# Patient Record
Sex: FEMALE | Race: WHITE | NOT HISPANIC OR LATINO | Employment: FULL TIME | URBAN - METROPOLITAN AREA
[De-identification: names, ages, dates, MRNs, and addresses within clinical notes are randomized per-mention and may not be internally consistent; named-entity substitution may affect disease eponyms.]

---

## 2019-09-30 ENCOUNTER — TRANSCRIBE ORDERS (OUTPATIENT)
Dept: ADMINISTRATIVE | Facility: HOSPITAL | Age: 39
End: 2019-09-30

## 2019-09-30 DIAGNOSIS — Z12.31 VISIT FOR SCREENING MAMMOGRAM: Primary | ICD-10-CM

## 2019-12-16 ENCOUNTER — HOSPITAL ENCOUNTER (OUTPATIENT)
Dept: MAMMOGRAPHY | Facility: HOSPITAL | Age: 39
Discharge: HOME | End: 2019-12-16
Admitting: OBSTETRICS & GYNECOLOGY

## 2019-12-16 DIAGNOSIS — Z12.31 VISIT FOR SCREENING MAMMOGRAM: ICD-10-CM

## 2019-12-16 PROCEDURE — 77067 SCR MAMMO BI INCL CAD: CPT

## 2019-12-16 PROCEDURE — 77067 SCR MAMMO BI INCL CAD: CPT | Performed by: RADIOLOGY

## 2019-12-16 PROCEDURE — 77063 BREAST TOMOSYNTHESIS BI: CPT | Performed by: RADIOLOGY

## 2019-12-16 PROCEDURE — 77063 BREAST TOMOSYNTHESIS BI: CPT

## 2020-11-05 ENCOUNTER — TRANSCRIBE ORDERS (OUTPATIENT)
Dept: OBSTETRICS AND GYNECOLOGY | Facility: CLINIC | Age: 40
End: 2020-11-05

## 2020-11-05 DIAGNOSIS — Z12.31 VISIT FOR SCREENING MAMMOGRAM: Primary | ICD-10-CM

## 2021-03-01 ENCOUNTER — OFFICE VISIT (OUTPATIENT)
Dept: OBSTETRICS AND GYNECOLOGY | Facility: CLINIC | Age: 41
End: 2021-03-01

## 2021-03-01 VITALS
DIASTOLIC BLOOD PRESSURE: 68 MMHG | HEIGHT: 68 IN | BODY MASS INDEX: 23.86 KG/M2 | SYSTOLIC BLOOD PRESSURE: 102 MMHG | WEIGHT: 157.4 LBS

## 2021-03-01 DIAGNOSIS — Z01.419 ENCOUNTER FOR GYNECOLOGICAL EXAMINATION: Primary | ICD-10-CM

## 2021-03-01 PROCEDURE — 99396 PREV VISIT EST AGE 40-64: CPT | Performed by: OBSTETRICS & GYNECOLOGY

## 2021-03-01 NOTE — PROGRESS NOTES
GYN Annual Exam     CC - Here for annual exam.     Calderon Hodges is a 40 y.o. female, , who presents for annual well woman exam.  Her last LMP was Patient's last menstrual period was 2021 (exact date)..  Periods are regular every 28-30 days, lasting 6 days.  Dysmenorrhea:none.  Patient reports problems with: none.  Partner Status: Marital Status: .  New Partners since last visit: YES/NO/Other: no.  Desires STD Screening: YES/NO/Other: no.    Additional OB/GYN History   Current contraception: vasectomy  Last Pap :   Last Completed Pap Smear       Status Date      PAP SMEAR Done 2019 in [place] (negative)        History of abnormal Pap smear: no  Family history of uterine, colon, breast, or ovarian cancer: yes  Maternal aunt had colon cancer  Performs monthly Self-Breast Exam: yes  Last mammogram:   Last Completed Mammogram       Status Date      MAMMOGRAM Done 2019 MAMMO SCREENING DIGITAL TOMOSYNTHESIS BILATERAL W CAD        Feelings of Anxiety or Depression: no  Tobacco Usage?: No   OB History        1    Para   1    Term   1            AB        Living   1       SAB        TAB        Ectopic        Molar        Multiple        Live Births   1                Health Maintenance   Topic Date Due   • Annual Gynecologic Pelvic and Breast Exam  1979   • ANNUAL PHYSICAL  10/06/1982   • TDAP/TD VACCINES (1 - Tdap) 10/06/1998   • HEPATITIS C SCREENING  10/09/2020   • MAMMOGRAM  2021   • PAP SMEAR  2022   • INFLUENZA VACCINE  Completed   • Pneumococcal Vaccine 0-64  Aged Out   • MENINGOCOCCAL VACCINE  Aged Out       No current outpatient medications on file.    The additional following portions of the patient's history were reviewed and updated as appropriate: allergies, current medications, past family history, past medical history, past social history, past surgical history and problem list.    Review of Systems   All other systems reviewed and  "are negative.      I have reviewed and agree with the HPI, ROS, and historical information as entered above. Can Villalobos MD    Objective   /68 (BP Location: Right arm, Patient Position: Sitting, Cuff Size: Adult)   Ht 172.7 cm (68\")   Wt 71.4 kg (157 lb 6.4 oz)   LMP 02/19/2021 (Exact Date)   Breastfeeding No   BMI 23.93 kg/m²     PE    Breast: Without masses ,nontender, no skin changes or retractions  Axilla: Normal, no lymphadenopathy  Heart: Regular rate no murmurs rubs or gallops  Lungs: Clear to auscultation, normal breath sounds bilaterally  Abdomen: Soft nontender, no hepatosplenomegaly, no guarding or rebound, no masses  Pelvic exam  External genitalia: Normal introitus and vulva  Vagina: Normal mucosa no bleeding inflammation or discharge  Bladder: Normal position nontender  Urethral meatus and urethra: Normal nontender  Cervix: No lesions, no discharge, bleeding or inflammation  Bimanual: Nontender adnexa clear, no sign of uterine or ovarian enlargement  Anal: No external lesions or hemorrhoids         Assessment/Plan     Assessment     Problem List Items Addressed This Visit        Genitourinary and Reproductive     Encounter for gynecological examination - Primary    Relevant Orders    Pap IG, HPV-hr          1. GYN annual well woman exam.   2. Normal exam, she can start Paps every 2 to 3 years    Plan     1. Next pap due in: 2 to 3 years  2. Reviewed HPV guidelines  3. Reviewed monthly self breast exams.  Instructed to call with lumps, pain, or breast discharge.  Yearly mammograms ordered.  4. Discussed signs and symptoms related to perimenopause.  Discussed OTC treatments for these symptoms.    5. Colonoscopy recommended.  Healthy lifestyle changes including diet and exercise reviewed  Preventative health initiatives reviewed  Patient with a family history of colon cancer may want to consider doing a colonoscopy earlier than 50, Cologuard might be an option also  Can Villalobos, " MD  03/01/2021

## 2021-03-04 DIAGNOSIS — Z01.419 ENCOUNTER FOR GYNECOLOGICAL EXAMINATION: ICD-10-CM

## 2021-03-08 ENCOUNTER — HOSPITAL ENCOUNTER (OUTPATIENT)
Dept: MAMMOGRAPHY | Facility: HOSPITAL | Age: 41
Discharge: HOME | End: 2021-03-08
Admitting: OBSTETRICS & GYNECOLOGY

## 2021-03-08 DIAGNOSIS — Z12.31 VISIT FOR SCREENING MAMMOGRAM: ICD-10-CM

## 2021-03-08 PROCEDURE — 77063 BREAST TOMOSYNTHESIS BI: CPT

## 2021-03-08 PROCEDURE — 77067 SCR MAMMO BI INCL CAD: CPT

## 2021-03-08 PROCEDURE — 77063 BREAST TOMOSYNTHESIS BI: CPT | Performed by: RADIOLOGY

## 2021-03-08 PROCEDURE — 77067 SCR MAMMO BI INCL CAD: CPT | Performed by: RADIOLOGY

## 2021-03-16 ENCOUNTER — HOSPITAL ENCOUNTER (OUTPATIENT)
Dept: ULTRASOUND IMAGING | Facility: HOSPITAL | Age: 41
Discharge: HOME | End: 2021-03-16
Admitting: RADIOLOGY

## 2021-03-16 DIAGNOSIS — R92.8 ABNORMAL MAMMOGRAM: ICD-10-CM

## 2021-03-16 PROCEDURE — 76642 ULTRASOUND BREAST LIMITED: CPT | Performed by: RADIOLOGY

## 2021-03-16 PROCEDURE — 76642 ULTRASOUND BREAST LIMITED: CPT

## 2022-01-04 ENCOUNTER — TRANSCRIBE ORDERS (OUTPATIENT)
Dept: ADMINISTRATIVE | Facility: HOSPITAL | Age: 42
End: 2022-01-04

## 2022-01-04 DIAGNOSIS — Z12.31 VISIT FOR SCREENING MAMMOGRAM: Primary | ICD-10-CM

## 2022-03-14 ENCOUNTER — HOSPITAL ENCOUNTER (OUTPATIENT)
Dept: MAMMOGRAPHY | Facility: HOSPITAL | Age: 42
Discharge: HOME | End: 2022-03-14
Admitting: OBSTETRICS & GYNECOLOGY

## 2022-03-14 DIAGNOSIS — Z12.31 VISIT FOR SCREENING MAMMOGRAM: ICD-10-CM

## 2022-03-14 PROCEDURE — 77067 SCR MAMMO BI INCL CAD: CPT | Performed by: RADIOLOGY

## 2022-03-14 PROCEDURE — 77063 BREAST TOMOSYNTHESIS BI: CPT | Performed by: RADIOLOGY

## 2022-03-14 PROCEDURE — 77067 SCR MAMMO BI INCL CAD: CPT

## 2022-03-14 PROCEDURE — 77063 BREAST TOMOSYNTHESIS BI: CPT

## 2022-09-19 ENCOUNTER — OFFICE VISIT (OUTPATIENT)
Dept: OBSTETRICS AND GYNECOLOGY | Facility: CLINIC | Age: 42
End: 2022-09-19

## 2022-09-19 VITALS
SYSTOLIC BLOOD PRESSURE: 110 MMHG | DIASTOLIC BLOOD PRESSURE: 70 MMHG | HEIGHT: 68 IN | BODY MASS INDEX: 22.58 KG/M2 | WEIGHT: 149 LBS

## 2022-09-19 DIAGNOSIS — Z01.419 ENCOUNTER FOR GYNECOLOGICAL EXAMINATION WITHOUT ABNORMAL FINDING: Primary | ICD-10-CM

## 2022-09-19 PROCEDURE — 99396 PREV VISIT EST AGE 40-64: CPT | Performed by: OBSTETRICS & GYNECOLOGY

## 2022-09-19 RX ORDER — ERGOCALCIFEROL 1.25 MG/1
CAPSULE ORAL
COMMUNITY
Start: 2022-09-15

## 2022-09-19 RX ORDER — DROSPIRENONE AND ESTETROL 3-14.2(28)
1 KIT ORAL DAILY
Qty: 90 TABLET | Refills: 3 | Status: SHIPPED | OUTPATIENT
Start: 2022-09-19

## 2023-01-13 ENCOUNTER — TRANSCRIBE ORDERS (OUTPATIENT)
Dept: ADMINISTRATIVE | Facility: HOSPITAL | Age: 43
End: 2023-01-13
Payer: COMMERCIAL

## 2023-01-13 DIAGNOSIS — Z12.31 VISIT FOR SCREENING MAMMOGRAM: Primary | ICD-10-CM

## 2023-01-27 ENCOUNTER — TRANSCRIBE ORDERS (OUTPATIENT)
Dept: ADMINISTRATIVE | Facility: HOSPITAL | Age: 43
End: 2023-01-27
Payer: COMMERCIAL

## 2023-01-27 DIAGNOSIS — I83.812 VARICOSE VEINS OF LEFT LOWER EXTREMITY WITH PAIN: Primary | ICD-10-CM

## 2023-02-20 ENCOUNTER — HOSPITAL ENCOUNTER (OUTPATIENT)
Dept: CARDIOLOGY | Facility: HOSPITAL | Age: 43
Discharge: HOME | End: 2023-02-20
Admitting: SURGERY
Payer: COMMERCIAL

## 2023-02-20 VITALS — WEIGHT: 150 LBS | HEIGHT: 68 IN | BODY MASS INDEX: 22.73 KG/M2

## 2023-02-20 DIAGNOSIS — I83.812 VARICOSE VEINS OF LEFT LOWER EXTREMITY WITH PAIN: ICD-10-CM

## 2023-02-20 PROCEDURE — 93971 EXTREMITY STUDY: CPT

## 2023-02-20 PROCEDURE — 93971 EXTREMITY STUDY: CPT | Performed by: INTERNAL MEDICINE

## 2023-02-21 LAB
BH CV LEFT LOWER VAS GSV BELOW KNEE REFLUX TIME: 0 SEC
BH CV LEFT LOWER VAS GSV KNEE REFLUX TIME: 4.3 SEC
BH CV LEFT LOWER VAS GSV KNEE TRANSVERSE DIAMETER: 0.3 CM
BH CV LEFT LOWER VAS GSV MID CALF REFLUX TIME: 0 SEC
BH CV LEFT LOWER VAS GSV MID CALF TRANS DIAMETER: 0.3 CM
BH CV LEFT LOWER VAS GSV PROX REFLUX TIME: 4.6 SEC
BH CV LEFT LOWER VAS GSV PROX TRANSVERSE DIAMETER: 0.4 CM
BH CV LEFT LOWER VAS GSVBELOW KNEE TRANSVERSE DIAMETER: 0.3 CM
BH CV LEFT LOWER VAS SAPHENOFEMORAL JUNCTION REFLUX TIME: 3.7 SEC
BH CV LEFT LOWER VAS SAPHENOFEMORAL JUNCTION TRANSVERSE DIAMETER: 0.5 CM
BH CV LEFT LOWER VAS SPJ REFLUX TIME: 0 SEC
BH CV LEFT LOWER VAS SPJ TRANS DIAMETER: 0.2 CM
BH CV LEFT LOWER VAS SSV MID CALF REFLUX TIME: 0 SEC
BH CV LEFT LOWER VAS SSV MID CALF TRANS DIAMETER: 0.2 CM
BH CV LEFT LOWER VAS VARICOSITY AK REFLUX TIME: 4.2 SEC
BH CV LEFT LOWER VAS VARICOSITY AK TRANS DIAMETER: 0.64 CM
BH CV LEFT LOWER VAS VARICOSITY BK TRANS DIAMETER: 0.22 CM
BH CV LOW VAS LEFT GREATER SAPH AK VESSEL: 1
BH CV LOW VAS LEFT GSV DIST THIGH VESSEL: 1
BH CV LOW VAS LEFT SAPHENOFEM VESSEL: 1
BH CV LOW VAS LEFT VARICOSITY AK VESSEL: 1
BH CV LOW VAS LEFT VARICOSITY BK VESSEL: 1
BH CV LOWER VAS LEFT GSV DIST THIGH COMPRESSIBILTY: NORMAL
BH CV LOWER VAS LEFT GSV MID CALF COMPRESSIBILTY: NORMAL
BH CV LOWER VASCULAR LEFT COMMON FEMORAL AUGMENT: NORMAL
BH CV LOWER VASCULAR LEFT COMMON FEMORAL COMPETENT: NORMAL
BH CV LOWER VASCULAR LEFT COMMON FEMORAL COMPRESS: NORMAL
BH CV LOWER VASCULAR LEFT COMMON FEMORAL PHASIC: NORMAL
BH CV LOWER VASCULAR LEFT COMMON FEMORAL SPONT: NORMAL
BH CV LOWER VASCULAR LEFT DISTAL FEMORAL AUGMENT: NORMAL
BH CV LOWER VASCULAR LEFT DISTAL FEMORAL COMPETENT: NORMAL
BH CV LOWER VASCULAR LEFT DISTAL FEMORAL COMPRESS: NORMAL
BH CV LOWER VASCULAR LEFT DISTAL FEMORAL PHASIC: NORMAL
BH CV LOWER VASCULAR LEFT DISTAL FEMORAL SPONT: NORMAL
BH CV LOWER VASCULAR LEFT GREATER SAPH AK COMPETENT: NORMAL
BH CV LOWER VASCULAR LEFT GREATER SAPH AK COMPRESS: NORMAL
BH CV LOWER VASCULAR LEFT GREATER SAPH BK COMPETENT: NORMAL
BH CV LOWER VASCULAR LEFT GREATER SAPH BK COMPRESS: NORMAL
BH CV LOWER VASCULAR LEFT GSV DIST THIGH COMPETENT: NORMAL
BH CV LOWER VASCULAR LEFT GSV MID CALF COMPETENT: NORMAL
BH CV LOWER VASCULAR LEFT MID FEMORAL AUGMENT: NORMAL
BH CV LOWER VASCULAR LEFT MID FEMORAL COMPETENT: NORMAL
BH CV LOWER VASCULAR LEFT MID FEMORAL COMPRESS: NORMAL
BH CV LOWER VASCULAR LEFT MID FEMORAL PHASIC: NORMAL
BH CV LOWER VASCULAR LEFT MID FEMORAL SPONT: NORMAL
BH CV LOWER VASCULAR LEFT PERONEAL COMPRESS: NORMAL
BH CV LOWER VASCULAR LEFT POPLITEAL AUGMENT: NORMAL
BH CV LOWER VASCULAR LEFT POPLITEAL COMPETENT: NORMAL
BH CV LOWER VASCULAR LEFT POPLITEAL COMPRESS: NORMAL
BH CV LOWER VASCULAR LEFT POPLITEAL PHASIC: NORMAL
BH CV LOWER VASCULAR LEFT POPLITEAL SPONT: NORMAL
BH CV LOWER VASCULAR LEFT POSTERIOR TIBIAL COMPRESS: NORMAL
BH CV LOWER VASCULAR LEFT PROFUNDA FEMORAL COMPETENT: NORMAL
BH CV LOWER VASCULAR LEFT PROFUNDA FEMORAL PHASIC: NORMAL
BH CV LOWER VASCULAR LEFT PROFUNDA FEMORAL SPONT: NORMAL
BH CV LOWER VASCULAR LEFT PROXIMAL FEMORAL AUGMENT: NORMAL
BH CV LOWER VASCULAR LEFT PROXIMAL FEMORAL COMPETENT: NORMAL
BH CV LOWER VASCULAR LEFT PROXIMAL FEMORAL COMPRESS: NORMAL
BH CV LOWER VASCULAR LEFT PROXIMAL FEMORAL PHASIC: NORMAL
BH CV LOWER VASCULAR LEFT PROXIMAL FEMORAL SPONT: NORMAL
BH CV LOWER VASCULAR LEFT SAPHENOFEMORAL JUNCTION AUGMENT: NORMAL
BH CV LOWER VASCULAR LEFT SAPHENOFEMORAL JUNCTION COMPETENT: NORMAL
BH CV LOWER VASCULAR LEFT SAPHENOFEMORAL JUNCTION COMPRESS: NORMAL
BH CV LOWER VASCULAR LEFT SAPHENOFEMORAL JUNCTION PHASIC: NORMAL
BH CV LOWER VASCULAR LEFT SAPHENOFEMORAL JUNCTION SPONT: NORMAL
BH CV LOWER VASCULAR LEFT SAPHENOPOP JX AUGMENT: NORMAL
BH CV LOWER VASCULAR LEFT SAPHENOPOP JX COMPETENT: NORMAL
BH CV LOWER VASCULAR LEFT SAPHENOPOP JX COMPRESS: NORMAL
BH CV LOWER VASCULAR LEFT SAPHENOPOP JX PHASIC: NORMAL
BH CV LOWER VASCULAR LEFT SAPHENOPOP JX SPONT: NORMAL
BH CV LOWER VASCULAR LEFT SSV MID CALF COMPETENT: NORMAL
BH CV LOWER VASCULAR LEFT SSV MID CALF COMPRESS: NORMAL
BH CV LOWER VASCULAR LEFT VARICOSITY AK COMPETENT: NORMAL
BH CV LOWER VASCULAR LEFT VARICOSITY AK COMPRESS: NORMAL
BH CV LOWER VASCULAR LEFT VARICOSITY BK COMPETENT: NORMAL
BH CV LOWER VASCULAR LEFT VARICOSITY BK COMPRESS: NORMAL
BH CV LOWER VASCULAR RIGHT COMMON FEMORAL AUGMENT: NORMAL
BH CV LOWER VASCULAR RIGHT COMMON FEMORAL COMPETENT: NORMAL
BH CV LOWER VASCULAR RIGHT COMMON FEMORAL COMPRESS: NORMAL
BH CV LOWER VASCULAR RIGHT COMMON FEMORAL PHASIC: NORMAL
BH CV LOWER VASCULAR RIGHT COMMON FEMORAL SPONT: NORMAL
MAXIMAL PREDICTED HEART RATE: 177 BPM
STRESS TARGET HR: 150 BPM

## 2023-03-20 ENCOUNTER — HOSPITAL ENCOUNTER (OUTPATIENT)
Dept: MAMMOGRAPHY | Facility: HOSPITAL | Age: 43
Discharge: HOME | End: 2023-03-20
Admitting: OBSTETRICS & GYNECOLOGY
Payer: COMMERCIAL

## 2023-03-20 DIAGNOSIS — Z12.31 VISIT FOR SCREENING MAMMOGRAM: ICD-10-CM

## 2023-03-20 PROCEDURE — 77063 BREAST TOMOSYNTHESIS BI: CPT | Performed by: RADIOLOGY

## 2023-03-20 PROCEDURE — 77063 BREAST TOMOSYNTHESIS BI: CPT

## 2023-03-20 PROCEDURE — 77067 SCR MAMMO BI INCL CAD: CPT | Performed by: RADIOLOGY

## 2023-03-20 PROCEDURE — 77067 SCR MAMMO BI INCL CAD: CPT

## 2023-03-27 ENCOUNTER — HOSPITAL ENCOUNTER (OUTPATIENT)
Dept: ULTRASOUND IMAGING | Facility: HOSPITAL | Age: 43
Discharge: HOME | End: 2023-03-27
Admitting: RADIOLOGY
Payer: COMMERCIAL

## 2023-03-27 DIAGNOSIS — R92.8 ABNORMAL MAMMOGRAM: ICD-10-CM

## 2023-03-27 PROCEDURE — 76642 ULTRASOUND BREAST LIMITED: CPT

## 2023-03-27 PROCEDURE — 76642 ULTRASOUND BREAST LIMITED: CPT | Performed by: RADIOLOGY

## 2023-09-25 ENCOUNTER — OFFICE VISIT (OUTPATIENT)
Dept: OBSTETRICS AND GYNECOLOGY | Facility: CLINIC | Age: 43
End: 2023-09-25
Payer: COMMERCIAL

## 2023-09-25 VITALS
WEIGHT: 151 LBS | DIASTOLIC BLOOD PRESSURE: 64 MMHG | SYSTOLIC BLOOD PRESSURE: 100 MMHG | HEIGHT: 68 IN | BODY MASS INDEX: 22.88 KG/M2

## 2023-09-25 DIAGNOSIS — Z01.419 ENCOUNTER FOR GYNECOLOGICAL EXAMINATION: Primary | ICD-10-CM

## 2024-04-22 ENCOUNTER — HOSPITAL ENCOUNTER (OUTPATIENT)
Dept: MAMMOGRAPHY | Facility: HOSPITAL | Age: 44
Discharge: HOME | End: 2024-04-22
Admitting: OBSTETRICS & GYNECOLOGY
Payer: COMMERCIAL

## 2024-04-22 DIAGNOSIS — Z01.419 ENCOUNTER FOR GYNECOLOGICAL EXAMINATION: ICD-10-CM

## 2024-04-22 PROCEDURE — 77063 BREAST TOMOSYNTHESIS BI: CPT

## 2024-04-22 PROCEDURE — 77067 SCR MAMMO BI INCL CAD: CPT

## 2025-01-13 ENCOUNTER — TRANSCRIBE ORDERS (OUTPATIENT)
Dept: ADMINISTRATIVE | Facility: HOSPITAL | Age: 45
End: 2025-01-13
Payer: COMMERCIAL

## 2025-01-13 DIAGNOSIS — Z12.31 VISIT FOR SCREENING MAMMOGRAM: Primary | ICD-10-CM

## 2025-02-03 ENCOUNTER — OFFICE VISIT (OUTPATIENT)
Dept: OBSTETRICS AND GYNECOLOGY | Facility: CLINIC | Age: 45
End: 2025-02-03
Payer: COMMERCIAL

## 2025-02-03 VITALS
SYSTOLIC BLOOD PRESSURE: 110 MMHG | BODY MASS INDEX: 23.43 KG/M2 | WEIGHT: 154.6 LBS | HEIGHT: 68 IN | DIASTOLIC BLOOD PRESSURE: 70 MMHG

## 2025-02-03 DIAGNOSIS — Z12.31 ENCOUNTER FOR SCREENING MAMMOGRAM FOR MALIGNANT NEOPLASM OF BREAST: ICD-10-CM

## 2025-02-03 DIAGNOSIS — Z12.11 SCREENING FOR COLON CANCER: ICD-10-CM

## 2025-02-03 DIAGNOSIS — Z01.419 ENCOUNTER FOR GYNECOLOGICAL EXAMINATION: Primary | ICD-10-CM

## 2025-02-03 NOTE — PROGRESS NOTES
Gynecologic Annual Exam Note          GYN Annual Exam     Gynecologic Exam (Annual )        Subjective     HPI  Phyllis Hodges is a 44 y.o. female, , who presents for annual well woman exam as a established patient. There were no changes to her medical or surgical history since her last visit..  Patient's last menstrual period was 2025.  Her periods occur every 21-35 days, lasting 4 days.  The flow is moderate. She reports dysmenorrhea is mild occurring first 1-2 days of flow.     Marital Status: . She is sexually active. She has not had new partners.. STD testing recommendations have been explained to the patient and she declines STD testing.    The patient would like to discuss the following complaints today: none    Additional OB/GYN History   contraceptive methods: Vasectomy   Desires to: do not start contraception  History of migraines: no    Last Pap : 3/1/2021. Result: negative. HPV: negative.   Last Completed Pap Smear            PAP SMEAR (Every 3 Years) Next due on 2/3/2028      2025  LIQUID-BASED PAP SMEAR WITH HPV GENOTYPING REGARDLESS OF INTERPRETATION ([facility])    2021  Pap IG, HPV-hr    2019  Done - in [place] (negative)                  History of abnormal Pap smear: no  Family history of uterine, colon, breast, or ovarian cancer: yes - MA had colon cancer  Performs monthly Self-Breast Exam: yes  Last mammogram: 2024. Done at . There is a copy in the chart.    Last Completed Mammogram            Scheduled - MAMMOGRAM (Every 2 Years) Scheduled for 2024  Mammo Screening Digital Tomosynthesis Bilateral With CAD    2023  Mammo Screening Digital Tomosynthesis Bilateral With CAD    2022  Mammo Screening Digital Tomosynthesis Bilateral With CAD    2021  Mammo Screening Digital Tomosynthesis Bilateral With CAD    2019  Mammo Screening Digital Tomosynthesis Bilateral With CAD    Only the first 5 history  entries have been loaded, but more history exists.                    Colonoscopy: has never had a colonoscopy or cologuard   Exercises Regularly: yes  Feelings of Anxiety or Depression: no  Tobacco Usage?: No       Current Outpatient Medications:     vitamin D (ERGOCALCIFEROL) 1.25 MG (89376 UT) capsule capsule, , Disp: , Rfl:      Patient denies the need for medication refills today.    OB History          1    Para   1    Term   1            AB        Living   1         SAB        IAB        Ectopic        Molar        Multiple        Live Births   1                Past Medical History:   Diagnosis Date    Asthma     Papanicolaou smear 2018    NEG    Screening breast examination     ADMITS        Past Surgical History:   Procedure Laterality Date    APPENDECTOMY      BREAST BIOPSY Right     BY ULTRASOUND     WISDOM TOOTH EXTRACTION         Health Maintenance   Topic Date Due    COLORECTAL CANCER SCREENING  Never done    HEPATITIS C SCREENING  Never done    ANNUAL PHYSICAL  Never done    INFLUENZA VACCINE  2024    COVID-19 Vaccine ( season) 2024    Annual Gynecologic Pelvic and Breast Exam  2024    MAMMOGRAM  2026    PAP SMEAR  2028    TDAP/TD VACCINES (2 - Td or Tdap) 2034    Pneumococcal Vaccine 0-49  Aged Out       The additional following portions of the patient's history were reviewed and updated as appropriate: allergies, current medications, past family history, past medical history, past social history, past surgical history, and problem list.    Review of Systems   Constitutional: Negative.    HENT: Negative.     Eyes: Negative.    Respiratory: Negative.     Cardiovascular: Negative.    Gastrointestinal: Negative.    Endocrine: Negative.    Genitourinary: Negative.    Musculoskeletal: Negative.    Skin: Negative.    Allergic/Immunologic: Negative.    Neurological: Negative.    Hematological: Negative.    Psychiatric/Behavioral:  "Negative.           I have reviewed and agree with the HPI, ROS, and historical information as entered above. Adri Price MD          Objective   /70   Ht 172.7 cm (67.99\")   Wt 70.1 kg (154 lb 9.6 oz)   LMP 01/17/2025   BMI 23.51 kg/m²     Physical Exam  General:  well developed; well nourished  no acute distress  Skin:  No suspicious lesions seen  Thyroid: normal to inspection and palpation  Breasts:  Examined in supine position  Symmetric without masses or skin dimpling  Nipples normal without inversion, lesions or discharge  There are no palpable axillary nodes  CVS: RRR, no M/R/G, distal pulses wnl  Resp: CTAB, No W/R/R  Abdomen: soft, non-tender; no masses  no umbilical or inguinal hernias are present  no hepato-splenomegaly  Pelvis: Clinical staff was present for exam  External genitalia:  normal appearance of the external genitalia including Bartholin's and [place]'s glands.  Urethra: no masses or tenderness  Urethral meatus: normal size;  No lesions or signs of prolapse  Bladder: non tender to palpation, no masses, no prolapse  Vagina:  normal pink mucosa without prolapse or lesions.  Cervix:  normal appearance.  Uterus:  normal size, shape and consistency.  Adnexa:  normal bimanual exam of the adnexa.  Perineum/Anus: normal appearance, no external hemorrhoids  Ext: 2+ pulses, no edema      Assessment and Plan    Problem List Items Addressed This Visit       Encounter for gynecological examination - Primary    Relevant Orders    LIQUID-BASED PAP SMEAR WITH HPV GENOTYPING REGARDLESS OF INTERPRETATION ([facility]) (Completed)     Other Visit Diagnoses       Encounter for screening mammogram for malignant neoplasm of breast        Relevant Orders    Mammo Screening Digital Tomosynthesis Bilateral With CAD    Screening for colon cancer        Relevant Orders    Ambulatory Referral For Screening Colonoscopy            GYN annual well woman exam.   Pap guidelines reviewed.  Reviewed monthly self " breast exams.  Instructed to call with lumps, pain, or breast discharge.    Ordered Mammogram today  Reviewed exercise as a preventative health measures.   Discussed importance of routine colon cancer screening. Reviewed current guidelines. Recommended colonoscopy after age 45.  Return in about 1 year (around 2/3/2026) for Annual physical, Appropriate for followup with NP as desired..     Adri Price MD  02/03/2025

## 2025-02-04 LAB — REF LAB TEST METHOD: NORMAL

## 2025-04-13 LAB
NCCN CRITERIA FLAG: NORMAL
TYRER CUZICK SCORE: 10.1

## 2025-04-28 ENCOUNTER — HOSPITAL ENCOUNTER (OUTPATIENT)
Dept: MAMMOGRAPHY | Facility: HOSPITAL | Age: 45
Discharge: HOME | End: 2025-04-28
Admitting: STUDENT IN AN ORGANIZED HEALTH CARE EDUCATION/TRAINING PROGRAM
Payer: COMMERCIAL

## 2025-04-28 DIAGNOSIS — Z12.31 VISIT FOR SCREENING MAMMOGRAM: ICD-10-CM

## 2025-04-28 PROCEDURE — 77063 BREAST TOMOSYNTHESIS BI: CPT

## 2025-04-28 PROCEDURE — 77067 SCR MAMMO BI INCL CAD: CPT

## 2025-04-30 PROCEDURE — 77067 SCR MAMMO BI INCL CAD: CPT | Performed by: RADIOLOGY

## 2025-04-30 PROCEDURE — 77063 BREAST TOMOSYNTHESIS BI: CPT | Performed by: RADIOLOGY

## 2025-05-09 ENCOUNTER — HOSPITAL ENCOUNTER (OUTPATIENT)
Dept: ULTRASOUND IMAGING | Facility: HOSPITAL | Age: 45
Discharge: HOME | End: 2025-05-09
Payer: COMMERCIAL

## 2025-05-09 ENCOUNTER — HOSPITAL ENCOUNTER (OUTPATIENT)
Dept: MAMMOGRAPHY | Facility: HOSPITAL | Age: 45
Discharge: HOME | End: 2025-05-09
Payer: COMMERCIAL

## 2025-05-09 DIAGNOSIS — R92.8 ABNORMAL MAMMOGRAM: ICD-10-CM

## 2025-05-09 PROCEDURE — G0279 TOMOSYNTHESIS, MAMMO: HCPCS

## 2025-05-09 PROCEDURE — 76642 ULTRASOUND BREAST LIMITED: CPT

## 2025-05-09 PROCEDURE — 77065 DX MAMMO INCL CAD UNI: CPT

## 2025-07-21 ENCOUNTER — TRANSCRIBE ORDERS (OUTPATIENT)
Dept: ADMINISTRATIVE | Facility: HOSPITAL | Age: 45
End: 2025-07-21
Payer: COMMERCIAL

## 2025-07-21 DIAGNOSIS — Z12.31 VISIT FOR SCREENING MAMMOGRAM: Primary | ICD-10-CM
